# Patient Record
(demographics unavailable — no encounter records)

---

## 2024-10-15 NOTE — PHYSICAL EXAM
[Normal] : mucosa is normal [Midline] : trachea located in midline position [Laryngoscopy Performed] : laryngoscopy was performed, see procedure section for findings [FreeTextEntry1] : good projection, decreased range, less pitch breaks

## 2024-10-15 NOTE — HISTORY OF PRESENT ILLNESS
[de-identified] : 4/16/24: 70 y/o F presents with constant cough since 2016. She reports it is productive and she expectorates clear phlegm, it is every day, throughout the day. No issues eating, or chewing, but she has had choking episodes on solids/ liquids over the past 2 months. No issues breathing. She has seen Dr. Mane and tested negative for asthma. No regurgitation of food. She feels her voice is raspy, worse over the past 6 months. Works as a . No significant voice demands. She has HTN, not on any ACE inhibitors. 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 Avila. She has history of reflux. She is on Pantoprazole and following a low acid diet, previously on Famotidine for at least one year with no improvement.  Diet: +tea, mint, garlic, onion, blueberry, carbonated beverages -coffee, soda, chocolate, tomato, citrus, garlic, spicy food water intake: 10 glasses of [employer] late night eating: no  Of note she has chronic rhinorrhea and postnasal drip. She denies facial pain or changes in smell or taste. She started using Flonase which she finds helpful, no rinses. Saw allergist 2 years ago. No pertinent positives per pt.  - 8/12/24 Patient has been following a low acid diet, added famotidine qPM to pantoprazole qAM, and daily saline sinus rinses, nasal steroids, azelastine and notes 70% improvement in terms of her cough. Voice improved with coughing less. She continues to cough in the mornings when she wakes up and in the afternoon at the office otherwise her cough is not present nearly as severely or frequently as it was before. She believes that it is related to her nose, triggered by the carpet in her office. Nasal drainage worse with eating cold foods. No issues eating, chewing, or swallowing. No breathing issues. No new ENT issues otherwise. - [FreeTextEntry1] : 10/14/24 Has continued pantoprazole and famotidine qhs and reflux diet.

## 2024-10-15 NOTE — PROCEDURE
[Video Captured] : video captured and filed [de-identified] : -   Pre-operative Diagnosis: Dysphonia Post-operative Diagnosis: laryngopharyngeal reflux, significant improvement in discolored yellow vocal fold, now back to normal.  Anesthesia: Topical - 1 % Lidocaine/Phenylephrine Procedure: Flexible Laryngoscopy with Stroboscopy - CPT 78355   Procedure Details: The patient was placed in the sitting position. After decongestant and anesthesia were applied the laryngoscope was passed. The nasal cavities, nasopharynx, oropharynx, hypopharynx, and larynx were all examined. Vocal folds were examined during respiration and phonation. The following findings were noted:   Findings: Nose: Septum is midline, turbinates are normal, nasal airways patent, mucosa normal Nasopharynx: Adenoids normal, no masses, eustachian tube normal Oropharynx: Pharyngeal walls symmetric and without lesion. Tonsils/fossae symmetric Hypopharynx: Hypopharynx and pyriform sinuses without lesion. No masses or asymmetry. + pooling of secretions. Larynx: Epiglottis and aryepiglottic folds were sharp and crisp bilaterally. Bilateral false vocal folds normal appearance. Airway was widely patent. + postcricoid edema, erythema of the vocal process   Strobe Exam Ratings   TVF Appearance: normal, mild erythema of the vocal process, significant improvement in terms of discolored yellow vocal fold seen on last exam TVF Mobility: normal Edema/hypertrophy: normal, + postcricoid edema Mucus on TVF: normal Glottic Closure: normal/adequate Mucosal Wave: normal Amplitude of Vibration: normal Phase: symmetric Supraglottic Hyperfunction: none Other Findings: none   Condition: Stable. Patient tolerated procedure well.   Complications: None  ---------------------

## 2024-10-15 NOTE — ASSESSMENT
[FreeTextEntry1] : - 4/16/24: 70 y/o F presents with cough since 2016. She reports it is productive and she expectorates clear phlegm, it is every day, throughout the day. No issues eating, or chewing, but she has had choking episodes on solids/ liquids over the past 2 months. She feels her voice is raspy, worse over the past 6 months. Based on history and physical exam, I do believe there is a component of laryngopharyngeal reflux. At this time I am recommending dietary and behavioral change to reduce acid in the diet. I am also recommending she continue Pantoprazole and Famotidine for a 3 months trial and cough avoidance. If cough persists consider treatment for neurogenic cough.   Of note she has chronic rhinorrhea and postnasal drip. On physical exam/ nasal endoscopy she was found to have left septal deviation. I am recommending nasal saline rinses, nasal steroids, add Azelastine.  8/12/24 70% subjective improvement in terms of chronic cough. On physical exam/stroboscopy, improvement in yellow discoloration on vocal folds likely 2/2 cough related trauma. I am recommending she begin Ipratropium nasal spray for 3 week trial and during that time she stop Azelastine and otherwise continue rinses, nasal steroids, and reflux management. If no benefit with ipratropium, switch back to azelastine. Follow up in 2-3 months. If stable with cough improvement can consider reintroducing certain foods such as blueberries.  -Continue Pantoprazole and Famotidine qhs and reflux diet -Nasal saline rinses and nasal steroids, add ipratropium for couple weeks, d/c azelastine -If no benefit with ipratropium, switch back to azelastine -Voice hygiene, increase hydration, sips of water throughout the day, avoid throat clearing, cough avoidance  -Followup in 2-3 months  -If still improved, can consider reintroducing certain foods such as blueberries

## 2025-04-11 NOTE — HISTORY OF PRESENT ILLNESS
[de-identified] : 4/16/24: 68 y/o F presents with constant cough since 2016. She reports it is productive and she expectorates clear phlegm, it is every day, throughout the day. No issues eating, or chewing, but she has had choking episodes on solids/ liquids over the past 2 months. No issues breathing. She has seen Dr. Mane and tested negative for asthma. No regurgitation of food. She feels her voice is raspy, worse over the past 6 months. Works as a . No significant voice demands. She has HTN, not on any ACE inhibitors. 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 Avila. She has history of reflux. She is on Pantoprazole and following a low acid diet, previously on Famotidine for at least one year with no improvement.  Diet: +tea, mint, garlic, onion, blueberry, carbonated beverages -coffee, soda, chocolate, tomato, citrus, garlic, spicy food water intake: 10 glasses of [employer] late night eating: no  Of note she has chronic rhinorrhea and postnasal drip. She denies facial pain or changes in smell or taste. She started using Flonase which she finds helpful, no rinses. Saw allergist 2 years ago. No pertinent positives per pt.  - 8/12/24 Patient has been following a low acid diet, added famotidine qPM to pantoprazole qAM, and daily saline sinus rinses, nasal steroids, azelastine and notes 70% improvement in terms of her cough. Voice improved with coughing less. She continues to cough in the mornings when she wakes up and in the afternoon at the office otherwise her cough is not present nearly as severely or frequently as it was before. She believes that it is related to her nose, triggered by the carpet in her office. Nasal drainage worse with eating cold foods. No issues eating, chewing, or swallowing. No breathing issues. No new ENT issues otherwise. - [FreeTextEntry1] : 4/11/25: Patient presents for repeat evaluation. She has been following a low acid diet and on antireflux medications, using nasal saline rinses and nasal antihistamines and feels cough worsened over the past 5 months. Overall cough is 60% improved compared to symptom onset. Last CXR was in 4/24 which was normal. She endorses multiple coughing fits throughout the day, longest she can go is 1 hour. She feels cough is triggered by certain scents such as aerosol sprays and perfumes. No issues eating, chewing, or swallowing. She feels her voice has been stable.

## 2025-04-11 NOTE — ASSESSMENT
[FreeTextEntry1] : - 4/16/24: 68 y/o F presents with cough since 2016. She reports it is productive and she expectorates clear phlegm, it is every day, throughout the day. No issues eating, or chewing, but she has had choking episodes on solids/ liquids over the past 2 months. She feels her voice is raspy, worse over the past 6 months. Based on history and physical exam, I do believe there is a component of laryngopharyngeal reflux. At this time I am recommending dietary and behavioral change to reduce acid in the diet. I am also recommending she continue Pantoprazole and Famotidine for a 3 months trial and cough avoidance. If cough persists consider treatment for neurogenic cough.   Of note she has chronic rhinorrhea and postnasal drip. On physical exam/ nasal endoscopy she was found to have left septal deviation. I am recommending nasal saline rinses, nasal steroids, add Azelastine.  8/12/24 70% subjective improvement in terms of chronic cough. On physical exam/stroboscopy, improvement in yellow discoloration on vocal folds likely 2/2 cough related trauma. I am recommending she begin Ipratropium nasal spray for 3 week trial and during that time she stop Azelastine and otherwise continue rinses, nasal steroids, and reflux management. If no benefit with ipratropium, switch back to azelastine. Follow up in 2-3 months. If stable with cough improvement can consider reintroducing certain foods such as blueberries.  4/11/25: She has been following a low acid diet and on antireflux medications, using nasal saline rinses and nasal antihistamines and feels cough worsened over the past 5 months. Overall cough is 60% improved compared to symptom onset. On physical exam she was found to have evidence of LPR. She has been treated for LPR, asthma has been ruled out. I suspect she may be suffering from neurogenic cough. We discussed treatment with neuromodulators and superior laryngeal nerve injections. She has stress test later today. I recommended trial of Tramadol and to follow up in 2 weeks for superior laryngeal nerve injection. I also recommended consultation with SLP for cough suppression. Continue current reflux regimen.   -Continue Pantoprazole and Famotidine qhs and reflux diet -Continue nasal saline rinses and nasal steroids and Azelastine  -Voice hygiene, increase hydration, sips of water throughout the day, avoid throat clearing, cough avoidance  -Trial of Tramadol  -Consultation with SLP for cough suppression -Followup in 2 weeks for superior laryngeal nerve injection

## 2025-04-11 NOTE — PROCEDURE
[Video Captured] : video captured and filed [de-identified] : -   Pre-operative Diagnosis: Dysphonia Post-operative Diagnosis: laryngopharyngeal reflux Anesthesia: Topical - 1 % Lidocaine/Phenylephrine Procedure: Flexible Laryngoscopy with Stroboscopy - CPT 60583   Procedure Details: The patient was placed in the sitting position. After decongestant and anesthesia were applied the laryngoscope was passed. The nasal cavities, nasopharynx, oropharynx, hypopharynx, and larynx were all examined. Vocal folds were examined during respiration and phonation. The following findings were noted:   Findings: Nose: Septum is midline, turbinates are normal, nasal airways patent, mucosa normal Nasopharynx: Adenoids normal, no masses, eustachian tube normal Oropharynx: Pharyngeal walls symmetric and without lesion. Tonsils/fossae symmetric Hypopharynx: Hypopharynx and pyriform sinuses without lesion. No masses or asymmetry. + pooling of secretions. Larynx: Epiglottis and aryepiglottic folds were sharp and crisp bilaterally. Bilateral false vocal folds normal appearance. Airway was widely patent. + postcricoid edema, erythema of the vocal process   Strobe Exam Ratings   TVF Appearance: normal, mild erythema of the vocal process TVF Mobility: normal Edema/hypertrophy: normal, + postcricoid edema Mucus on TVF: normal Glottic Closure: normal/adequate Mucosal Wave: normal Amplitude of Vibration: normal Phase: symmetric Supraglottic Hyperfunction: none Other Findings: none   Condition: Stable. Patient tolerated procedure well.   Complications: None  ---------------------

## 2025-05-02 NOTE — PROCEDURE
[Video Captured] : video captured and filed [de-identified] : -   Pre-operative Diagnosis: Dysphonia Post-operative Diagnosis: laryngopharyngeal reflux Anesthesia: Topical - 1 % Lidocaine/Phenylephrine Procedure: Flexible Laryngoscopy with Stroboscopy - CPT 31376   Procedure Details: The patient was placed in the sitting position. After decongestant and anesthesia were applied the laryngoscope was passed. The nasal cavities, nasopharynx, oropharynx, hypopharynx, and larynx were all examined. Vocal folds were examined during respiration and phonation. The following findings were noted:   Findings: Nose: Septum is midline, turbinates are normal, nasal airways patent, mucosa normal Nasopharynx: Adenoids normal, no masses, eustachian tube normal Oropharynx: Pharyngeal walls symmetric and without lesion. Tonsils/fossae symmetric Hypopharynx: Hypopharynx and pyriform sinuses without lesion. No masses or asymmetry. + pooling of secretions. Larynx: Epiglottis and aryepiglottic folds were sharp and crisp bilaterally. Bilateral false vocal folds normal appearance. Airway was widely patent. + postcricoid edema, erythema of the vocal process   Strobe Exam Ratings   TVF Appearance: normal, mild erythema of the vocal process TVF Mobility: normal Edema/hypertrophy: normal, + postcricoid edema Mucus on TVF: normal Glottic Closure: normal/adequate Mucosal Wave: normal Amplitude of Vibration: normal Phase: symmetric Supraglottic Hyperfunction: none Other Findings: none   Condition: Stable. Patient tolerated procedure well.   Complications: None  ---------------------

## 2025-05-02 NOTE — HISTORY OF PRESENT ILLNESS
[de-identified] : 4/16/24: 70 y/o F presents with constant cough since 2016. She reports it is productive and she expectorates clear phlegm, it is every day, throughout the day. No issues eating, or chewing, but she has had choking episodes on solids/ liquids over the past 2 months. No issues breathing. She has seen Dr. Mane and tested negative for asthma. No regurgitation of food. She feels her voice is raspy, worse over the past 6 months. Works as a . No significant voice demands. She has HTN, not on any ACE inhibitors. 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 Avila. She has history of reflux. She is on Pantoprazole and following a low acid diet, previously on Famotidine for at least one year with no improvement.  Diet: +tea, mint, garlic, onion, blueberry, carbonated beverages -coffee, soda, chocolate, tomato, citrus, garlic, spicy food water intake: 10 glasses of [employer] late night eating: no  Of note she has chronic rhinorrhea and postnasal drip. She denies facial pain or changes in smell or taste. She started using Flonase which she finds helpful, no rinses. Saw allergist 2 years ago. No pertinent positives per pt.  - 8/12/24 Patient has been following a low acid diet, added famotidine qPM to pantoprazole qAM, and daily saline sinus rinses, nasal steroids, azelastine and notes 70% improvement in terms of her cough. Voice improved with coughing less. She continues to cough in the mornings when she wakes up and in the afternoon at the office otherwise her cough is not present nearly as severely or frequently as it was before. She believes that it is related to her nose, triggered by the carpet in her office. Nasal drainage worse with eating cold foods. No issues eating, chewing, or swallowing. No breathing issues. No new ENT issues otherwise. - 4/11/25: Patient presents for repeat evaluation. She has been following a low acid diet and on antireflux medications, using nasal saline rinses and nasal antihistamines and feels cough worsened over the past 5 months. Overall cough is 60% improved compared to symptom onset. Last CXR was in 4/24 which was normal. She endorses multiple coughing fits throughout the day, longest she can go is 1 hour. She feels cough is triggered by certain scents such as aerosol sprays and perfumes. No issues eating, chewing, or swallowing. She feels her voice has been stable.  - [FreeTextEntry1] : 5/1/25: Patient has been following a low acid diet, on antireflux medications, and tried Tramadol and notes

## 2025-05-02 NOTE — ASSESSMENT
[FreeTextEntry1] : - 4/16/24: 70 y/o F presents with cough since 2016. She reports it is productive and she expectorates clear phlegm, it is every day, throughout the day. No issues eating, or chewing, but she has had choking episodes on solids/ liquids over the past 2 months. She feels her voice is raspy, worse over the past 6 months. Based on history and physical exam, I do believe there is a component of laryngopharyngeal reflux. At this time I am recommending dietary and behavioral change to reduce acid in the diet. I am also recommending she continue Pantoprazole and Famotidine for a 3 months trial and cough avoidance. If cough persists consider treatment for neurogenic cough.   Of note she has chronic rhinorrhea and postnasal drip. On physical exam/ nasal endoscopy she was found to have left septal deviation. I am recommending nasal saline rinses, nasal steroids, add Azelastine.  8/12/24 70% subjective improvement in terms of chronic cough. On physical exam/stroboscopy, improvement in yellow discoloration on vocal folds likely 2/2 cough related trauma. I am recommending she begin Ipratropium nasal spray for 3 week trial and during that time she stop Azelastine and otherwise continue rinses, nasal steroids, and reflux management. If no benefit with ipratropium, switch back to azelastine. Follow up in 2-3 months. If stable with cough improvement can consider reintroducing certain foods such as blueberries.  4/11/25: She has been following a low acid diet and on antireflux medications, using nasal saline rinses and nasal antihistamines and feels cough worsened over the past 5 months. Overall cough is 60% improved compared to symptom onset. On physical exam she was found to have evidence of LPR. She has been treated for LPR, asthma has been ruled out. I suspect she may be suffering from neurogenic cough. We discussed treatment with neuromodulators and superior laryngeal nerve injections. She has stress test later today. I recommended trial of Tramadol and to follow up in 2 weeks for superior laryngeal nerve injection. I also recommended consultation with SLP for cough suppression. Continue current reflux regimen.   5/1/25:   -Continue Pantoprazole and Famotidine qhs and reflux diet -Continue nasal saline rinses and nasal steroids and Azelastine  -Voice hygiene, increase hydration, sips of water throughout the day, avoid throat clearing, cough avoidance  -Trial of Tramadol  -Consultation with SLP for cough suppression -Followup in 2 weeks for superior laryngeal nerve injection

## 2025-05-07 NOTE — ASSESSMENT
[FreeTextEntry1] : - 4/16/24: 70 y/o F presents with cough since 2016. She reports it is productive and she expectorates clear phlegm, it is every day, throughout the day. No issues eating, or chewing, but she has had choking episodes on solids/ liquids over the past 2 months. She feels her voice is raspy, worse over the past 6 months. Based on history and physical exam, I do believe there is a component of laryngopharyngeal reflux. At this time I am recommending dietary and behavioral change to reduce acid in the diet. I am also recommending she continue Pantoprazole and Famotidine for a 3 months trial and cough avoidance. If cough persists consider treatment for neurogenic cough.   Of note she has chronic rhinorrhea and postnasal drip. On physical exam/ nasal endoscopy she was found to have left septal deviation. I am recommending nasal saline rinses, nasal steroids, add Azelastine.  8/12/24 70% subjective improvement in terms of chronic cough. On physical exam/stroboscopy, improvement in yellow discoloration on vocal folds likely 2/2 cough related trauma. I am recommending she begin Ipratropium nasal spray for 3 week trial and during that time she stop Azelastine and otherwise continue rinses, nasal steroids, and reflux management. If no benefit with ipratropium, switch back to azelastine. Follow up in 2-3 months. If stable with cough improvement can consider reintroducing certain foods such as blueberries.  4/11/25: She has been following a low acid diet and on antireflux medications, using nasal saline rinses and nasal antihistamines and feels cough worsened over the past 5 months. Overall cough is 60% improved compared to symptom onset. On physical exam she was found to have evidence of LPR. She has been treated for LPR, asthma has been ruled out. I suspect she may be suffering from neurogenic cough. We discussed treatment with neuromodulators and superior laryngeal nerve injections. She has stress test later today. I recommended trial of Tramadol and to follow up in 2 weeks for superior laryngeal nerve injection. I also recommended consultation with SLP for cough suppression. Continue current reflux regimen.   5/7/25 No change in symptoms.  Tramadol was not effective.  Did left superior laryngeal nerve injection without issue.  Follow up in 2 weeks for repeat injection, next on the right side.  -Left superior laryngeal nerve injection today, Plan for right side in 2 weeks. -Continue Pantoprazole and Famotidine qhs and reflux diet -Continue nasal saline rinses and nasal steroids and Azelastine  -Voice hygiene, increase hydration, sips of water throughout the day, avoid throat clearing, cough avoidance  -Consultation with SLP for cough suppression -Followup in 2 weeks for Right superior laryngeal nerve injection

## 2025-05-07 NOTE — ASSESSMENT
[FreeTextEntry1] : - 4/16/24: 68 y/o F presents with cough since 2016. She reports it is productive and she expectorates clear phlegm, it is every day, throughout the day. No issues eating, or chewing, but she has had choking episodes on solids/ liquids over the past 2 months. She feels her voice is raspy, worse over the past 6 months. Based on history and physical exam, I do believe there is a component of laryngopharyngeal reflux. At this time I am recommending dietary and behavioral change to reduce acid in the diet. I am also recommending she continue Pantoprazole and Famotidine for a 3 months trial and cough avoidance. If cough persists consider treatment for neurogenic cough.   Of note she has chronic rhinorrhea and postnasal drip. On physical exam/ nasal endoscopy she was found to have left septal deviation. I am recommending nasal saline rinses, nasal steroids, add Azelastine.  8/12/24 70% subjective improvement in terms of chronic cough. On physical exam/stroboscopy, improvement in yellow discoloration on vocal folds likely 2/2 cough related trauma. I am recommending she begin Ipratropium nasal spray for 3 week trial and during that time she stop Azelastine and otherwise continue rinses, nasal steroids, and reflux management. If no benefit with ipratropium, switch back to azelastine. Follow up in 2-3 months. If stable with cough improvement can consider reintroducing certain foods such as blueberries.  4/11/25: She has been following a low acid diet and on antireflux medications, using nasal saline rinses and nasal antihistamines and feels cough worsened over the past 5 months. Overall cough is 60% improved compared to symptom onset. On physical exam she was found to have evidence of LPR. She has been treated for LPR, asthma has been ruled out. I suspect she may be suffering from neurogenic cough. We discussed treatment with neuromodulators and superior laryngeal nerve injections. She has stress test later today. I recommended trial of Tramadol and to follow up in 2 weeks for superior laryngeal nerve injection. I also recommended consultation with SLP for cough suppression. Continue current reflux regimen.   5/7/25 No change in symptoms.  Tramadol was not effective.  Did left superior laryngeal nerve injection without issue.  Follow up in 2 weeks for repeat injection, next on the right side.  -Left superior laryngeal nerve injection today, Plan for right side in 2 weeks. -Continue Pantoprazole and Famotidine qhs and reflux diet -Continue nasal saline rinses and nasal steroids and Azelastine  -Voice hygiene, increase hydration, sips of water throughout the day, avoid throat clearing, cough avoidance  -Consultation with SLP for cough suppression -Followup in 2 weeks for Right superior laryngeal nerve injection

## 2025-05-07 NOTE — HISTORY OF PRESENT ILLNESS
[de-identified] : 4/16/24: 68 y/o F presents with constant cough since 2016. She reports it is productive and she expectorates clear phlegm, it is every day, throughout the day. No issues eating, or chewing, but she has had choking episodes on solids/ liquids over the past 2 months. No issues breathing. She has seen Dr. Mane and tested negative for asthma. No regurgitation of food. She feels her voice is raspy, worse over the past 6 months. Works as a . No significant voice demands. She has HTN, not on any ACE inhibitors. 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 Avila. She has history of reflux. She is on Pantoprazole and following a low acid diet, previously on Famotidine for at least one year with no improvement.  Diet: +tea, mint, garlic, onion, blueberry, carbonated beverages -coffee, soda, chocolate, tomato, citrus, garlic, spicy food water intake: 10 glasses of [employer] late night eating: no  Of note she has chronic rhinorrhea and postnasal drip. She denies facial pain or changes in smell or taste. She started using Flonase which she finds helpful, no rinses. Saw allergist 2 years ago. No pertinent positives per pt.  - 8/12/24 Patient has been following a low acid diet, added famotidine qPM to pantoprazole qAM, and daily saline sinus rinses, nasal steroids, azelastine and notes 70% improvement in terms of her cough. Voice improved with coughing less. She continues to cough in the mornings when she wakes up and in the afternoon at the office otherwise her cough is not present nearly as severely or frequently as it was before. She believes that it is related to her nose, triggered by the carpet in her office. Nasal drainage worse with eating cold foods. No issues eating, chewing, or swallowing. No breathing issues. No new ENT issues otherwise. - 4/11/25: Patient presents for repeat evaluation. She has been following a low acid diet and on antireflux medications, using nasal saline rinses and nasal antihistamines and feels cough worsened over the past 5 months. Overall cough is 60% improved compared to symptom onset. Last CXR was in 4/24 which was normal. She endorses multiple coughing fits throughout the day, longest she can go is 1 hour. She feels cough is triggered by certain scents such as aerosol sprays and perfumes. No issues eating, chewing, or swallowing. She feels her voice has been stable.  - [FreeTextEntry1] : 5/7/25 Continued pantoprazole/famotidine and initiated tramadol trial without improvement. Cough suppression therapy not yet scheduled.  Still with cough.  No new symptoms.  No new ENT issues otherwise.

## 2025-05-22 NOTE — PROCEDURE
[Video Captured] : video captured and filed [de-identified] : -   Pre-operative Diagnosis: Dysphonia Post-operative Diagnosis: laryngopharyngeal reflux Anesthesia: Topical - 1 % Lidocaine/Phenylephrine Procedure: Flexible Laryngoscopy with Stroboscopy - [facility] [id number]   Procedure Details: The patient was placed in the sitting position. After decongestant and anesthesia were applied the laryngoscope was passed. The nasal cavities, nasopharynx, oropharynx, hypopharynx, and larynx were all examined. Vocal folds were examined during respiration and phonation. The following findings were noted:   Findings: Nose: Septum is midline, turbinates are normal, nasal airways patent, mucosa normal Nasopharynx: Adenoids normal, no masses, eustachian tube normal Oropharynx: Pharyngeal walls symmetric and without lesion. Tonsils/fossae symmetric Hypopharynx: Hypopharynx and pyriform sinuses without lesion. No masses or asymmetry. + pooling of secretions. Larynx: Epiglottis and aryepiglottic folds were sharp and crisp bilaterally. Bilateral false vocal folds normal appearance. Airway was widely patent. + postcricoid edema, erythema of the vocal process   Strobe Exam Ratings   TVF Appearance: normal, mild erythema of the vocal process TVF Mobility: normal Edema/hypertrophy: normal, + postcricoid edema Mucus on TVF: normal Glottic Closure: normal/adequate Mucosal Wave: normal Amplitude of Vibration: normal Phase: symmetric Supraglottic Hyperfunction: none Other Findings: none   Condition: Stable. Patient tolerated procedure well.   Complications: None  ---------------------    ----------------------------------------------------------------------------------------------------- PREOPERATIVE DIAGNOSIS: neurogenic cough   POSTOPERATIVE DIAGNOSIS: Same as above   NAME OF PROCEDURE: Right Superior Laryngeal Nerve Injection - 43759   SURGEON: Bruce Delcid MD   ASSISTANTS: none   ANESTHESIA: none   ESTIMATED BLOOD LOSS: < 1 cc   SPECIMENS: None   COMPLICATIONS: None   INDICATIONS FOR SURGERY: This is a patient with throat discomfort and chronic cough with presumed neurogenic cough. The decision was made to proceed with a superior laryngeal nerve injection. The patient understands the risks, benefits, and alternatives of the surgery including possibility of worsened voice, pain, discomfort, bleeding, need for further surgery, difficulty swallowing, and wished to go ahead with the operation as planned. I spoke with the patient and all of their questions were answered to their satisfaction and they asked me to perform this procedure.   DETAILS OF THE PROCEDURE: The patient was brought to the procedure room and I identified them. A formal timeout was called.   The neck was wiped clean with an alcohol wipe.  Next I palpated landmarks including the hyoid, and thryoid cartilage.  Once the thyrohyoid membrane was identified I palpated the carotid on the right side and protected this with my finger.  Next using a 25 gauge needle I injected 2cc (50:50 Kenalog 40 and 0.5 % bupivacaine) 1cm anterior and 1cm deep to the carotid and into the thyroid membrane. The injector was then withdrawn.  No bleeding was identified.  The patient was observed for 15 minutes without complication.    Complications: none Disposition: home

## 2025-05-22 NOTE — HISTORY OF PRESENT ILLNESS
[de-identified] : 4/16/24: 68 y/o F presents with constant cough since 2016. She reports it is productive and she expectorates clear phlegm, it is every day, throughout the day. No issues eating, or chewing, but she has had choking episodes on solids/ liquids over the past 2 months. No issues breathing. She has seen Dr. Mane and tested negative for asthma. No regurgitation of food. She feels her voice is raspy, worse over the past 6 months. Works as a . No significant voice demands. She has HTN, not on any ACE inhibitors. 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 Avila. She has history of reflux. She is on Pantoprazole and following a low acid diet, previously on Famotidine for at least one year with no improvement.  Diet: +tea, mint, garlic, onion, blueberry, carbonated beverages -coffee, soda, chocolate, tomato, citrus, garlic, spicy food water intake: 10 glasses of [employer] late night eating: no  Of note she has chronic rhinorrhea and postnasal drip. She denies facial pain or changes in smell or taste. She started using Flonase which she finds helpful, no rinses. Saw allergist 2 years ago. No pertinent positives per pt.  - 8/12/24 Patient has been following a low acid diet, added famotidine qPM to pantoprazole qAM, and daily saline sinus rinses, nasal steroids, azelastine and notes 70% improvement in terms of her cough. Voice improved with coughing less. She continues to cough in the mornings when she wakes up and in the afternoon at the office otherwise her cough is not present nearly as severely or frequently as it was before. She believes that it is related to her nose, triggered by the carpet in her office. Nasal drainage worse with eating cold foods. No issues eating, chewing, or swallowing. No breathing issues. No new ENT issues otherwise. - 4/11/25: Patient presents for repeat evaluation. She has been following a low acid diet and on antireflux medications, using nasal saline rinses and nasal antihistamines and feels cough worsened over the past 5 months. Overall cough is 60% improved compared to symptom onset. Last CXR was in 4/24 which was normal. She endorses multiple coughing fits throughout the day, longest she can go is 1 hour. She feels cough is triggered by certain scents such as aerosol sprays and perfumes. No issues eating, chewing, or swallowing. She feels her voice has been stable.  - 5/7/25 Continued pantoprazole/famotidine and initiated tramadol trial without improvement. Cough suppression therapy not yet scheduled.  Still with cough.  No new symptoms.  No new ENT issues otherwise. - [FreeTextEntry1] : 5/22/25: 2 weeks s/p left superior laryngeal nerve injection.  Notes an 85% improvement in terms of cough related symptoms.  She is very happy with these results.  Did not like taking the tramadol.  No new ENT issues otherwise.

## 2025-05-22 NOTE — ASSESSMENT
[FreeTextEntry1] : - 4/16/24: 68 y/o F presents with cough since 2016. She reports it is productive and she expectorates clear phlegm, it is every day, throughout the day. No issues eating, or chewing, but she has had choking episodes on solids/ liquids over the past 2 months. She feels her voice is raspy, worse over the past 6 months. Based on history and physical exam, I do believe there is a component of laryngopharyngeal reflux. At this time I am recommending dietary and behavioral change to reduce acid in the diet. I am also recommending she continue Pantoprazole and Famotidine for a 3 months trial and cough avoidance. If cough persists consider treatment for neurogenic cough.   Of note she has chronic rhinorrhea and postnasal drip. On physical exam/ nasal endoscopy she was found to have left septal deviation. I am recommending nasal saline rinses, nasal steroids, add Azelastine.  8/12/24 70% subjective improvement in terms of chronic cough. On physical exam/stroboscopy, improvement in yellow discoloration on vocal folds likely 2/2 cough related trauma. I am recommending she begin Ipratropium nasal spray for 3 week trial and during that time she stop Azelastine and otherwise continue rinses, nasal steroids, and reflux management. If no benefit with ipratropium, switch back to azelastine. Follow up in 2-3 months. If stable with cough improvement can consider reintroducing certain foods such as blueberries.  4/11/25: She has been following a low acid diet and on antireflux medications, using nasal saline rinses and nasal antihistamines and feels cough worsened over the past 5 months. Overall cough is 60% improved compared to symptom onset. On physical exam she was found to have evidence of LPR. She has been treated for LPR, asthma has been ruled out. I suspect she may be suffering from neurogenic cough. We discussed treatment with neuromodulators and superior laryngeal nerve injections. She has stress test later today. I recommended trial of Tramadol and to follow up in 2 weeks for superior laryngeal nerve injection. I also recommended consultation with SLP for cough suppression. Continue current reflux regimen.   5/7/25 No change in symptoms.  Tramadol was not effective.  Did left superior laryngeal nerve injection without issue.  Follow up in 2 weeks for repeat injection, next on the right side.  5/22/25: 2 weeks s/p left superior laryngeal nerve injection.  Notes an 85% improvement in terms of symptoms.  We had a lengthy discussion regarding proceeding with another injection versus observing for now.  Patient wanted to proceed with the right side and will follow-up in 2 weeks to review.  If stable or improved, we will likely recommend observation instead of proceeding with more injections.  -right superior laryngeal nerve injection today, -Continue Pantoprazole and Famotidine qhs and reflux diet -Continue nasal saline rinses and nasal steroids and Azelastine  -Voice hygiene, increase hydration, sips of water throughout the day, avoid throat clearing, cough avoidance  -Consultation with SLP for cough suppression -Followup in 2 weeks

## 2025-05-22 NOTE — PROCEDURE
[Video Captured] : video captured and filed [de-identified] : -   Pre-operative Diagnosis: Dysphonia Post-operative Diagnosis: laryngopharyngeal reflux Anesthesia: Topical - 1 % Lidocaine/Phenylephrine Procedure: Flexible Laryngoscopy with Stroboscopy - [facility] [id number]   Procedure Details: The patient was placed in the sitting position. After decongestant and anesthesia were applied the laryngoscope was passed. The nasal cavities, nasopharynx, oropharynx, hypopharynx, and larynx were all examined. Vocal folds were examined during respiration and phonation. The following findings were noted:   Findings: Nose: Septum is midline, turbinates are normal, nasal airways patent, mucosa normal Nasopharynx: Adenoids normal, no masses, eustachian tube normal Oropharynx: Pharyngeal walls symmetric and without lesion. Tonsils/fossae symmetric Hypopharynx: Hypopharynx and pyriform sinuses without lesion. No masses or asymmetry. + pooling of secretions. Larynx: Epiglottis and aryepiglottic folds were sharp and crisp bilaterally. Bilateral false vocal folds normal appearance. Airway was widely patent. + postcricoid edema, erythema of the vocal process   Strobe Exam Ratings   TVF Appearance: normal, mild erythema of the vocal process TVF Mobility: normal Edema/hypertrophy: normal, + postcricoid edema Mucus on TVF: normal Glottic Closure: normal/adequate Mucosal Wave: normal Amplitude of Vibration: normal Phase: symmetric Supraglottic Hyperfunction: none Other Findings: none   Condition: Stable. Patient tolerated procedure well.   Complications: None  ---------------------    ----------------------------------------------------------------------------------------------------- PREOPERATIVE DIAGNOSIS: neurogenic cough   POSTOPERATIVE DIAGNOSIS: Same as above   NAME OF PROCEDURE: Right Superior Laryngeal Nerve Injection - 66612   SURGEON: Bruce Delcid MD   ASSISTANTS: none   ANESTHESIA: none   ESTIMATED BLOOD LOSS: < 1 cc   SPECIMENS: None   COMPLICATIONS: None   INDICATIONS FOR SURGERY: This is a patient with throat discomfort and chronic cough with presumed neurogenic cough. The decision was made to proceed with a superior laryngeal nerve injection. The patient understands the risks, benefits, and alternatives of the surgery including possibility of worsened voice, pain, discomfort, bleeding, need for further surgery, difficulty swallowing, and wished to go ahead with the operation as planned. I spoke with the patient and all of their questions were answered to their satisfaction and they asked me to perform this procedure.   DETAILS OF THE PROCEDURE: The patient was brought to the procedure room and I identified them. A formal timeout was called.   The neck was wiped clean with an alcohol wipe.  Next I palpated landmarks including the hyoid, and thryoid cartilage.  Once the thyrohyoid membrane was identified I palpated the carotid on the right side and protected this with my finger.  Next using a 25 gauge needle I injected 2cc (50:50 Kenalog 40 and 0.5 % bupivacaine) 1cm anterior and 1cm deep to the carotid and into the thyroid membrane. The injector was then withdrawn.  No bleeding was identified.  The patient was observed for 15 minutes without complication.    Complications: none Disposition: home

## 2025-05-22 NOTE — HISTORY OF PRESENT ILLNESS
[de-identified] : 4/16/24: 70 y/o F presents with constant cough since 2016. She reports it is productive and she expectorates clear phlegm, it is every day, throughout the day. No issues eating, or chewing, but she has had choking episodes on solids/ liquids over the past 2 months. No issues breathing. She has seen Dr. Mane and tested negative for asthma. No regurgitation of food. She feels her voice is raspy, worse over the past 6 months. Works as a . No significant voice demands. She has HTN, not on any ACE inhibitors. 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 Avila. She has history of reflux. She is on Pantoprazole and following a low acid diet, previously on Famotidine for at least one year with no improvement.  Diet: +tea, mint, garlic, onion, blueberry, carbonated beverages -coffee, soda, chocolate, tomato, citrus, garlic, spicy food water intake: 10 glasses of [employer] late night eating: no  Of note she has chronic rhinorrhea and postnasal drip. She denies facial pain or changes in smell or taste. She started using Flonase which she finds helpful, no rinses. Saw allergist 2 years ago. No pertinent positives per pt.  - 8/12/24 Patient has been following a low acid diet, added famotidine qPM to pantoprazole qAM, and daily saline sinus rinses, nasal steroids, azelastine and notes 70% improvement in terms of her cough. Voice improved with coughing less. She continues to cough in the mornings when she wakes up and in the afternoon at the office otherwise her cough is not present nearly as severely or frequently as it was before. She believes that it is related to her nose, triggered by the carpet in her office. Nasal drainage worse with eating cold foods. No issues eating, chewing, or swallowing. No breathing issues. No new ENT issues otherwise. - 4/11/25: Patient presents for repeat evaluation. She has been following a low acid diet and on antireflux medications, using nasal saline rinses and nasal antihistamines and feels cough worsened over the past 5 months. Overall cough is 60% improved compared to symptom onset. Last CXR was in 4/24 which was normal. She endorses multiple coughing fits throughout the day, longest she can go is 1 hour. She feels cough is triggered by certain scents such as aerosol sprays and perfumes. No issues eating, chewing, or swallowing. She feels her voice has been stable.  - 5/7/25 Continued pantoprazole/famotidine and initiated tramadol trial without improvement. Cough suppression therapy not yet scheduled.  Still with cough.  No new symptoms.  No new ENT issues otherwise. - [FreeTextEntry1] : 5/22/25: 2 weeks s/p left superior laryngeal nerve injection.  Notes an 85% improvement in terms of cough related symptoms.  She is very happy with these results.  Did not like taking the tramadol.  No new ENT issues otherwise.

## 2025-05-22 NOTE — HISTORY OF PRESENT ILLNESS
[de-identified] : 4/16/24: 70 y/o F presents with constant cough since 2016. She reports it is productive and she expectorates clear phlegm, it is every day, throughout the day. No issues eating, or chewing, but she has had choking episodes on solids/ liquids over the past 2 months. No issues breathing. She has seen Dr. Mane and tested negative for asthma. No regurgitation of food. She feels her voice is raspy, worse over the past 6 months. Works as a . No significant voice demands. She has HTN, not on any ACE inhibitors. 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 Avila. She has history of reflux. She is on Pantoprazole and following a low acid diet, previously on Famotidine for at least one year with no improvement.  Diet: +tea, mint, garlic, onion, blueberry, carbonated beverages -coffee, soda, chocolate, tomato, citrus, garlic, spicy food water intake: 10 glasses of [employer] late night eating: no  Of note she has chronic rhinorrhea and postnasal drip. She denies facial pain or changes in smell or taste. She started using Flonase which she finds helpful, no rinses. Saw allergist 2 years ago. No pertinent positives per pt.  - 8/12/24 Patient has been following a low acid diet, added famotidine qPM to pantoprazole qAM, and daily saline sinus rinses, nasal steroids, azelastine and notes 70% improvement in terms of her cough. Voice improved with coughing less. She continues to cough in the mornings when she wakes up and in the afternoon at the office otherwise her cough is not present nearly as severely or frequently as it was before. She believes that it is related to her nose, triggered by the carpet in her office. Nasal drainage worse with eating cold foods. No issues eating, chewing, or swallowing. No breathing issues. No new ENT issues otherwise. - 4/11/25: Patient presents for repeat evaluation. She has been following a low acid diet and on antireflux medications, using nasal saline rinses and nasal antihistamines and feels cough worsened over the past 5 months. Overall cough is 60% improved compared to symptom onset. Last CXR was in 4/24 which was normal. She endorses multiple coughing fits throughout the day, longest she can go is 1 hour. She feels cough is triggered by certain scents such as aerosol sprays and perfumes. No issues eating, chewing, or swallowing. She feels her voice has been stable.  - 5/7/25 Continued pantoprazole/famotidine and initiated tramadol trial without improvement. Cough suppression therapy not yet scheduled.  Still with cough.  No new symptoms.  No new ENT issues otherwise. - [FreeTextEntry1] : 5/22/25: 2 weeks s/p left superior laryngeal nerve injection.  Notes an 85% improvement in terms of cough related symptoms.  She is very happy with these results.  Did not like taking the tramadol.  No new ENT issues otherwise.

## 2025-05-22 NOTE — PROCEDURE
[Video Captured] : video captured and filed [de-identified] : -   Pre-operative Diagnosis: Dysphonia Post-operative Diagnosis: laryngopharyngeal reflux Anesthesia: Topical - 1 % Lidocaine/Phenylephrine Procedure: Flexible Laryngoscopy with Stroboscopy - [facility] [id number]   Procedure Details: The patient was placed in the sitting position. After decongestant and anesthesia were applied the laryngoscope was passed. The nasal cavities, nasopharynx, oropharynx, hypopharynx, and larynx were all examined. Vocal folds were examined during respiration and phonation. The following findings were noted:   Findings: Nose: Septum is midline, turbinates are normal, nasal airways patent, mucosa normal Nasopharynx: Adenoids normal, no masses, eustachian tube normal Oropharynx: Pharyngeal walls symmetric and without lesion. Tonsils/fossae symmetric Hypopharynx: Hypopharynx and pyriform sinuses without lesion. No masses or asymmetry. + pooling of secretions. Larynx: Epiglottis and aryepiglottic folds were sharp and crisp bilaterally. Bilateral false vocal folds normal appearance. Airway was widely patent. + postcricoid edema, erythema of the vocal process   Strobe Exam Ratings   TVF Appearance: normal, mild erythema of the vocal process TVF Mobility: normal Edema/hypertrophy: normal, + postcricoid edema Mucus on TVF: normal Glottic Closure: normal/adequate Mucosal Wave: normal Amplitude of Vibration: normal Phase: symmetric Supraglottic Hyperfunction: none Other Findings: none   Condition: Stable. Patient tolerated procedure well.   Complications: None  ---------------------    ----------------------------------------------------------------------------------------------------- PREOPERATIVE DIAGNOSIS: neurogenic cough   POSTOPERATIVE DIAGNOSIS: Same as above   NAME OF PROCEDURE: Right Superior Laryngeal Nerve Injection - 97605   SURGEON: Bruce Delcid MD   ASSISTANTS: none   ANESTHESIA: none   ESTIMATED BLOOD LOSS: < 1 cc   SPECIMENS: None   COMPLICATIONS: None   INDICATIONS FOR SURGERY: This is a patient with throat discomfort and chronic cough with presumed neurogenic cough. The decision was made to proceed with a superior laryngeal nerve injection. The patient understands the risks, benefits, and alternatives of the surgery including possibility of worsened voice, pain, discomfort, bleeding, need for further surgery, difficulty swallowing, and wished to go ahead with the operation as planned. I spoke with the patient and all of their questions were answered to their satisfaction and they asked me to perform this procedure.   DETAILS OF THE PROCEDURE: The patient was brought to the procedure room and I identified them. A formal timeout was called.   The neck was wiped clean with an alcohol wipe.  Next I palpated landmarks including the hyoid, and thryoid cartilage.  Once the thyrohyoid membrane was identified I palpated the carotid on the right side and protected this with my finger.  Next using a 25 gauge needle I injected 2cc (50:50 Kenalog 40 and 0.5 % bupivacaine) 1cm anterior and 1cm deep to the carotid and into the thyroid membrane. The injector was then withdrawn.  No bleeding was identified.  The patient was observed for 15 minutes without complication.    Complications: none Disposition: home